# Patient Record
Sex: FEMALE | Race: BLACK OR AFRICAN AMERICAN | URBAN - METROPOLITAN AREA
[De-identification: names, ages, dates, MRNs, and addresses within clinical notes are randomized per-mention and may not be internally consistent; named-entity substitution may affect disease eponyms.]

---

## 2023-06-07 ENCOUNTER — WEB ENCOUNTER (OUTPATIENT)
Dept: URBAN - METROPOLITAN AREA CLINIC 25 | Facility: CLINIC | Age: 63
End: 2023-06-07

## 2023-06-07 ENCOUNTER — OFFICE VISIT (OUTPATIENT)
Dept: URBAN - METROPOLITAN AREA CLINIC 25 | Facility: CLINIC | Age: 63
End: 2023-06-07
Payer: OTHER GOVERNMENT

## 2023-06-07 VITALS
HEART RATE: 71 BPM | BODY MASS INDEX: 21.48 KG/M2 | HEIGHT: 63 IN | SYSTOLIC BLOOD PRESSURE: 121 MMHG | DIASTOLIC BLOOD PRESSURE: 79 MMHG | WEIGHT: 121.2 LBS | TEMPERATURE: 97.5 F

## 2023-06-07 DIAGNOSIS — R10.30 LOWER ABDOMINAL PAIN: ICD-10-CM

## 2023-06-07 DIAGNOSIS — K60.0 ACUTE ANAL FISSURE: ICD-10-CM

## 2023-06-07 DIAGNOSIS — R19.4 CHANGE IN BOWEL HABIT: ICD-10-CM

## 2023-06-07 PROCEDURE — 99204 OFFICE O/P NEW MOD 45 MIN: CPT | Performed by: INTERNAL MEDICINE

## 2023-06-07 NOTE — PHYSICAL EXAM GASTROINTESTINAL
Abdomen , soft, nontender, nondistended , no guarding or rigidity , no masses palpable , normal bowel sounds , Liver and Spleen , no hepatomegaly present , no hepatosplenomegaly , liver nontender , spleen not palpable .  Rectal.  Chaperone present.  Posterior anal fissure

## 2023-06-07 NOTE — HPI-TODAY'S VISIT:
This patient was referred by Dr. Elvia Edge for an evaluation of abdominal pain.  A copy of this will be sent to the referring provider.  Starting in May she developed change in her BM's with constipation.  She has having a lot of abdominal cramp.  She is not sure what triggered her symptoms.  She also has an anal pressure.  She has a daily BM.  She has poor emptying.  She denies change in her stool caliber.  She denies LGI bleed or melena.  She denies rectal pain.  She denies protrusion.  She had a poor appetite but this has improved.  She denies weight loss.  She denies UGI symptoms except when she ahs some of the cramping pain.  The pain is not related to her BM's.  She denies narcotics.  There is no FHx of colon cancer.  Her last colonoscopy was about 2 years ago at [facility].  She had a few polyps with a 5 year recall.  Per the patient, CT Scan from last week was normal

## 2023-06-08 ENCOUNTER — TELEPHONE ENCOUNTER (OUTPATIENT)
Dept: URBAN - METROPOLITAN AREA CLINIC 25 | Facility: CLINIC | Age: 63
End: 2023-06-08

## 2023-08-02 ENCOUNTER — OFFICE VISIT (OUTPATIENT)
Dept: URBAN - METROPOLITAN AREA CLINIC 25 | Facility: CLINIC | Age: 63
End: 2023-08-02
Payer: OTHER GOVERNMENT

## 2023-08-02 ENCOUNTER — DASHBOARD ENCOUNTERS (OUTPATIENT)
Age: 63
End: 2023-08-02

## 2023-08-02 ENCOUNTER — TELEPHONE ENCOUNTER (OUTPATIENT)
Dept: URBAN - METROPOLITAN AREA CLINIC 25 | Facility: CLINIC | Age: 63
End: 2023-08-02

## 2023-08-02 VITALS
HEART RATE: 68 BPM | SYSTOLIC BLOOD PRESSURE: 116 MMHG | WEIGHT: 120.2 LBS | DIASTOLIC BLOOD PRESSURE: 71 MMHG | BODY MASS INDEX: 21.3 KG/M2 | TEMPERATURE: 97.7 F | HEIGHT: 63 IN

## 2023-08-02 DIAGNOSIS — K60.0 ACUTE ANAL FISSURE: ICD-10-CM

## 2023-08-02 DIAGNOSIS — R19.4 CHANGE IN BOWEL HABIT: ICD-10-CM

## 2023-08-02 PROCEDURE — 99213 OFFICE O/P EST LOW 20 MIN: CPT | Performed by: INTERNAL MEDICINE

## 2023-08-02 NOTE — HPI-TODAY'S VISIT:
She used the Nifedipine ointment for 8 weeks.  It was helpful.  She denies rectal pain or bleed.  Her bowels are improved.  She no longer has constipation.  She is using fiber gummies daily.  She did not have to use the Miralax.  She denies anroexia or weight loss.  She denies abdominal .  She denies UGI symptoms.  She is satisfied with her present symptoms

## 2024-10-02 ENCOUNTER — OFFICE VISIT (OUTPATIENT)
Dept: URBAN - METROPOLITAN AREA CLINIC 25 | Facility: CLINIC | Age: 64
End: 2024-10-02
Payer: OTHER GOVERNMENT

## 2024-10-02 VITALS
HEART RATE: 76 BPM | DIASTOLIC BLOOD PRESSURE: 76 MMHG | SYSTOLIC BLOOD PRESSURE: 128 MMHG | HEIGHT: 63 IN | BODY MASS INDEX: 21.23 KG/M2 | TEMPERATURE: 98.2 F | WEIGHT: 119.8 LBS

## 2024-10-02 DIAGNOSIS — R10.2 PELVIC PAIN IN FEMALE: ICD-10-CM

## 2024-10-02 DIAGNOSIS — L29.0 RECTAL ITCHING: ICD-10-CM

## 2024-10-02 DIAGNOSIS — R10.84 ABDOMINAL CRAMPING, GENERALIZED: ICD-10-CM

## 2024-10-02 PROCEDURE — 99214 OFFICE O/P EST MOD 30 MIN: CPT | Performed by: INTERNAL MEDICINE

## 2024-10-02 NOTE — PHYSICAL EXAM GASTROINTESTINAL
Abdomen , soft, nontender, nondistended , no guarding or rigidity , no masses palpable , normal bowel sounds , Liver and Spleen,  no hepatosplenomegaly , liver nontender, right lower quadrant tenderness, left lower quadrant tenderness

## 2024-10-02 NOTE — HPI-TODAY'S VISIT:
Patient seen a year ago.  In June she developed abdominal pain and pelvic pain.  Per the patient US was normal.  She was started on Pepcid with improvement.  About 2 weeks ago she missed a BM and kaitlyn used a suppository.  After this she developed the pelvic pain and urgency for a BM with rectal itch.  Since then, she has the persistent symptoms of pelvic pain and rectal itch.  She has been having regular BM's.  She denies rectal pain.  Her present symptoms are simialr to when she ahd her fissure.  She denies LGI bleed. She denies anorexia or weight loss.  She deniesUGI symptoms

## 2024-10-16 ENCOUNTER — OFFICE VISIT (OUTPATIENT)
Dept: URBAN - METROPOLITAN AREA SURGERY CENTER 20 | Facility: SURGERY CENTER | Age: 64
End: 2024-10-16
Payer: OTHER GOVERNMENT

## 2024-10-16 DIAGNOSIS — K62.89 OTHER SPECIFIED DISEASES OF ANUS AND RECTUM: ICD-10-CM

## 2024-10-16 DIAGNOSIS — K62.89 RECTAL PAIN: ICD-10-CM

## 2024-10-16 DIAGNOSIS — R10.32 ABDOMINAL CRAMPING IN LEFT LOWER QUADRANT: ICD-10-CM

## 2024-10-16 DIAGNOSIS — K64.8 HEMORRHOIDS, INTERNAL. NON BLEEDING,: ICD-10-CM

## 2024-10-16 DIAGNOSIS — K63.89 OTHER SPECIFIED DISEASES OF INTESTINE: ICD-10-CM

## 2024-10-16 PROCEDURE — 45330 DIAGNOSTIC SIGMOIDOSCOPY: CPT | Performed by: INTERNAL MEDICINE

## 2024-10-16 PROCEDURE — 00811 ANES LWR INTST NDSC NOS: CPT | Performed by: NURSE ANESTHETIST, CERTIFIED REGISTERED
